# Patient Record
Sex: MALE | Race: WHITE | NOT HISPANIC OR LATINO | Employment: OTHER | URBAN - METROPOLITAN AREA
[De-identification: names, ages, dates, MRNs, and addresses within clinical notes are randomized per-mention and may not be internally consistent; named-entity substitution may affect disease eponyms.]

---

## 2015-03-26 LAB — CRC RECOMMENDATION EXT: NORMAL

## 2017-01-05 PROBLEM — R73.9 HYPERGLYCEMIA: Status: ACTIVE | Noted: 2017-01-05

## 2017-02-17 PROBLEM — M51.36 DDD (DEGENERATIVE DISC DISEASE), LUMBAR: Status: ACTIVE | Noted: 2017-02-17

## 2017-03-06 PROBLEM — M51.36 ANNULAR TEAR OF LUMBAR DISC: Status: ACTIVE | Noted: 2017-03-06

## 2017-03-06 PROBLEM — R05.9 COUGH: Status: ACTIVE | Noted: 2017-03-06

## 2017-03-06 PROBLEM — M51.26 PROTRUDED LUMBAR DISC: Status: ACTIVE | Noted: 2017-03-06

## 2017-05-03 PROBLEM — M47.816 LUMBAR SPONDYLOSIS: Status: ACTIVE | Noted: 2017-05-03

## 2017-05-03 PROBLEM — M51.26 LUMBAR HERNIATED DISC: Status: ACTIVE | Noted: 2017-05-03

## 2017-05-03 PROBLEM — R22.42 MASS OF LEFT HIP REGION: Status: ACTIVE | Noted: 2017-05-03

## 2017-05-04 ENCOUNTER — HOSPITAL ENCOUNTER (OUTPATIENT)
Dept: RADIOLOGY | Facility: HOSPITAL | Age: 62
Discharge: HOME | End: 2017-05-04
Attending: SPECIALIST
Payer: COMMERCIAL

## 2017-05-04 DIAGNOSIS — M89.9 DISORDER OF BONE: ICD-10-CM

## 2017-05-04 PROCEDURE — 73723 MRI JOINT LWR EXTR W/O&W/DYE: CPT | Mod: 26,LT,, | Performed by: RADIOLOGY

## 2017-05-04 PROCEDURE — A9585 GADOBUTROL INJECTION: HCPCS | Performed by: SPECIALIST

## 2017-05-04 PROCEDURE — 25500020 PHARM REV CODE 255: Performed by: SPECIALIST

## 2017-05-04 PROCEDURE — 73723 MRI JOINT LWR EXTR W/O&W/DYE: CPT | Mod: TC,LT

## 2017-05-04 RX ORDER — GADOBUTROL 604.72 MG/ML
INJECTION INTRAVENOUS
Status: DISCONTINUED
Start: 2017-05-04 | End: 2017-05-05 | Stop reason: HOSPADM

## 2017-05-04 RX ORDER — GADOBUTROL 604.72 MG/ML
10 INJECTION INTRAVENOUS
Status: COMPLETED | OUTPATIENT
Start: 2017-05-04 | End: 2017-05-04

## 2017-05-04 RX ADMIN — GADOBUTROL 10 ML: 604.72 INJECTION INTRAVENOUS at 08:05

## 2017-05-12 PROBLEM — D16.22 OSTEOCHONDROMA OF LEFT FEMUR: Status: ACTIVE | Noted: 2017-05-12

## 2017-05-12 PROBLEM — M16.12 ARTHRITIS OF LEFT HIP: Status: ACTIVE | Noted: 2017-05-12

## 2018-01-11 ENCOUNTER — HOSPITAL ENCOUNTER (OUTPATIENT)
Dept: RADIOLOGY | Facility: HOSPITAL | Age: 63
Discharge: HOME | End: 2018-01-11
Attending: INTERNAL MEDICINE
Payer: COMMERCIAL

## 2018-01-11 PROCEDURE — 73721 MRI JNT OF LWR EXTRE W/O DYE: CPT | Mod: 26,LT,, | Performed by: RADIOLOGY

## 2018-01-11 PROCEDURE — 73721 MRI JNT OF LWR EXTRE W/O DYE: CPT | Mod: TC,LT

## 2018-04-23 ENCOUNTER — TELEPHONE (OUTPATIENT)
Dept: ORTHOPEDICS | Facility: CLINIC | Age: 63
End: 2018-04-23

## 2018-04-23 ENCOUNTER — PATIENT MESSAGE (OUTPATIENT)
Dept: ORTHOPEDICS | Facility: CLINIC | Age: 63
End: 2018-04-23

## 2018-04-23 NOTE — TELEPHONE ENCOUNTER
Called pt to advise that we do not treat osteochondroma of his left femur.  Advised that I will contact PCP to have him referred to correct provider.  Pt verbalized understanding.

## 2018-04-23 NOTE — TELEPHONE ENCOUNTER
----- Message from Wendy Byrd LPN sent at 4/23/2018 11:12 AM CDT -----  Contact: Dr. Ole Ortiz's office      ----- Message -----  From: Helen Worthington  Sent: 4/23/2018  10:19 AM  To: [facility] Ortho Triage    Diana is calling to speak with Dr Lowe in regards to pt (did not give detail) and can be reached at [phone].    Thank you

## 2018-04-23 NOTE — TELEPHONE ENCOUNTER
I spoke with doctor's office and informed them that Dr. Lowe stated that he could see patient in late June/early July.  Nurse says that she will get back with us.

## 2018-05-04 ENCOUNTER — OFFICE VISIT (OUTPATIENT)
Dept: ORTHOPEDICS | Facility: CLINIC | Age: 63
End: 2018-05-04
Payer: COMMERCIAL

## 2018-05-04 VITALS — HEIGHT: 70 IN | WEIGHT: 265 LBS | BODY MASS INDEX: 37.94 KG/M2

## 2018-05-04 DIAGNOSIS — M76.62 ACHILLES TENDINITIS OF LEFT LOWER EXTREMITY: ICD-10-CM

## 2018-05-04 DIAGNOSIS — D16.22 OSTEOCHONDROMA OF FEMUR, LEFT: Primary | ICD-10-CM

## 2018-05-04 PROCEDURE — 3008F BODY MASS INDEX DOCD: CPT | Mod: CPTII,S$GLB,, | Performed by: ORTHOPAEDIC SURGERY

## 2018-05-04 PROCEDURE — 99999 PR PBB SHADOW E&M-EST. PATIENT-LVL III: CPT | Mod: PBBFAC,,, | Performed by: ORTHOPAEDIC SURGERY

## 2018-05-04 PROCEDURE — 99203 OFFICE O/P NEW LOW 30 MIN: CPT | Mod: S$GLB,,, | Performed by: ORTHOPAEDIC SURGERY

## 2018-05-04 RX ORDER — INDOMETHACIN 50 MG/1
50 CAPSULE ORAL
Qty: 90 CAPSULE | Refills: 1 | Status: SHIPPED | OUTPATIENT
Start: 2018-05-04 | End: 2018-07-16

## 2018-05-04 NOTE — PROGRESS NOTES
Subjective:      Patient ID: Austen Parish is a 62 y.o. male.    Chief Complaint: Left hip and left heel pain  HPI      The patient seeks evaluation of an osteochondroma the left hip.  He reports that he is known about it for a year and a half.  It causes mild intermittent discomfort, but does not really limit his activities.  He also complains of about 1 month of increasing left heel pain.  This limits his walking.  Neither of these problems have been actively treated    Review of Systems   Constitution: Negative for fever and weight loss.   HENT: Negative for congestion.    Eyes: Negative for visual disturbance.   Cardiovascular: Negative for chest pain.   Respiratory: Negative for shortness of breath.    Hematologic/Lymphatic: Negative for bleeding problem. Does not bruise/bleed easily.   Skin: Negative for poor wound healing.   Musculoskeletal: Positive for joint pain.   Gastrointestinal: Negative for abdominal pain.   Genitourinary: Negative for dysuria.   Neurological: Negative for seizures.   Psychiatric/Behavioral: Negative for altered mental status.   Allergic/Immunologic: Negative for persistent infections.         Objective:            Ortho/SPM Exam      There were no vitals filed for this visit.     Left Hip    The patient is not in acute distress.   Body habitus is:obese.   The patient walks without a limp.   The skin over the hip is:intact.   There is:A palpable nontender hard mass on the posterior lateral aspect of the proximal femur.  Range of motion- Flexion 110, External rotation 35, internal rotation 30.  Resisted SLR negative.  Pain with rotation negative  Sciatic tension findings negative.  Shortening/lengthening compared to the contralateral side exam deferred.  Pulses DP present, PT present.  Motor normal 5/5 strength in all tested muscle groups.   Sensory normal.  There is tenderness in the mid substance of the left Achilles.  There is full range of motion in the ankle.    I reviewed the  relevant radiographic images for the patient's condition: Outside plain films and MRI of the left hip are reviewed.  There is a pedunculated osteochondroma in the area of the lesser trochanter.  There are no findings to suggest malignancy.      Assessment:       Encounter Diagnoses   Name Primary?    Achilles tendinitis of left lower extremity     Osteochondroma of femur, left Yes            The osteochondroma is a benign condition.  Elective excision is possible that entails the usual surgical risks as well is that of fracture    The Achilles problem has the potential to be persistent and limiting  Plan:       Austen was seen today for pain and pain.    Diagnoses and all orders for this visit:    Osteochondroma of femur, left    Achilles tendinitis of left lower extremity        I explained my opinion about both problems.  I recommend that we treat the Achilles problem aggressively for now.  Appropriate modification of shoes explained.  activity modification explained.  Indocin with usual precautions    The osteochondroma requires observation; if it enlarges or becomes painful excision can be considered

## 2018-05-04 NOTE — LETTER
May 4, 2018      Betty Woods III, MD  [address]  [place] [address]           [facility]  [address] [place] [address] [address]  [facility] [address]  Phone: [phone]          Patient: Austen Parish   MR Number: [id number]   YOB: 1955   Date of Visit: 5/4/2018       Dear Dr. Betty Woods III:    Thank you for referring Austen Parish to me for evaluation. Attached you will find relevant portions of my assessment and plan of care.    If you have questions, please do not hesitate to call me. I look forward to following Austen Parish along with you.    Sincerely,    Tano Ross MD    Enclosure  CC:  No Recipients    If you would like to receive this communication electronically, please contact [email or web address] or [phone] to request more information on [employer] access.    For providers and/or their staff who would like to refer a patient to [facility], please contact us through our one-stop-shop provider referral line, [facility], at [phone].    If you feel you have received this communication in error or would no longer like to receive these types of communications, please e-mail [email or web address]

## 2018-05-10 ENCOUNTER — PATIENT MESSAGE (OUTPATIENT)
Dept: ORTHOPEDICS | Facility: CLINIC | Age: 63
End: 2018-05-10

## 2018-05-10 ENCOUNTER — TELEPHONE (OUTPATIENT)
Dept: ORTHOPEDICS | Facility: CLINIC | Age: 63
End: 2018-05-10

## 2018-05-10 NOTE — TELEPHONE ENCOUNTER
My [facility] chart message was sent to patient.  ----- Message from Aviva Hernandez sent at 5/10/2018 10:44 AM CDT -----  Contact: Self/ [phone]  Patient called in to speak about upcoming appointment and x ray.     Please call and advise.

## 2018-05-11 ENCOUNTER — TELEPHONE (OUTPATIENT)
Dept: ORTHOPEDICS | Facility: CLINIC | Age: 63
End: 2018-05-11

## 2018-05-11 NOTE — TELEPHONE ENCOUNTER
Spoke with patient to inform him xray orders are in and he may get the xrays completed at any [facility] facility. Verbalized understanding.

## 2018-05-15 ENCOUNTER — HOSPITAL ENCOUNTER (OUTPATIENT)
Dept: RADIOLOGY | Facility: HOSPITAL | Age: 63
Discharge: HOME | End: 2018-05-15
Attending: ORTHOPAEDIC SURGERY
Payer: COMMERCIAL

## 2018-05-15 DIAGNOSIS — D16.22 OSTEOCHONDROMA OF FEMUR, LEFT: ICD-10-CM

## 2018-05-15 PROCEDURE — 73502 X-RAY EXAM HIP UNI 2-3 VIEWS: CPT | Mod: TC,FY,LT

## 2018-05-15 PROCEDURE — 73502 X-RAY EXAM HIP UNI 2-3 VIEWS: CPT | Mod: 26,LT,, | Performed by: RADIOLOGY

## 2018-06-05 ENCOUNTER — OFFICE VISIT (OUTPATIENT)
Dept: ORTHOPEDICS | Facility: CLINIC | Age: 63
End: 2018-06-05
Payer: COMMERCIAL

## 2018-06-05 VITALS
DIASTOLIC BLOOD PRESSURE: 69 MMHG | HEIGHT: 70 IN | BODY MASS INDEX: 38.8 KG/M2 | WEIGHT: 271 LBS | SYSTOLIC BLOOD PRESSURE: 123 MMHG

## 2018-06-05 DIAGNOSIS — M76.62 ACHILLES TENDINITIS OF LEFT LOWER EXTREMITY: Primary | ICD-10-CM

## 2018-06-05 DIAGNOSIS — D16.22 OSTEOCHONDROMA OF FEMUR, LEFT: ICD-10-CM

## 2018-06-05 PROCEDURE — 99999 PR PBB SHADOW E&M-EST. PATIENT-LVL III: CPT | Mod: PBBFAC,,, | Performed by: ORTHOPAEDIC SURGERY

## 2018-06-05 PROCEDURE — 99213 OFFICE O/P EST LOW 20 MIN: CPT | Mod: S$GLB,,, | Performed by: ORTHOPAEDIC SURGERY

## 2018-06-05 PROCEDURE — 3008F BODY MASS INDEX DOCD: CPT | Mod: CPTII,S$GLB,, | Performed by: ORTHOPAEDIC SURGERY

## 2018-06-05 PROCEDURE — 3074F SYST BP LT 130 MM HG: CPT | Mod: CPTII,S$GLB,, | Performed by: ORTHOPAEDIC SURGERY

## 2018-06-05 PROCEDURE — 3078F DIAST BP <80 MM HG: CPT | Mod: CPTII,S$GLB,, | Performed by: ORTHOPAEDIC SURGERY

## 2018-06-05 NOTE — PROGRESS NOTES
Subjective:      Patient ID: Austen Parish is a 62 y.o. male.    Chief Complaint: Pain of the Left Hip    HPI  Follow up for left hip and left Achilles.  Patient reports that the Achilles is improving.  The hip is unchanged.  He denies any degree of disabling symptoms either place at this time      Review of Systems   Constitution: Negative for fever and weight loss.   HENT: Negative for congestion.    Eyes: Negative for visual disturbance.   Cardiovascular: Negative for chest pain.   Respiratory: Negative for shortness of breath.    Hematologic/Lymphatic: Negative for bleeding problem. Does not bruise/bleed easily.   Skin: Negative for poor wound healing.   Gastrointestinal: Negative for abdominal pain.   Genitourinary: Negative for dysuria.   Neurological: Negative for seizures.   Psychiatric/Behavioral: Negative for altered mental status.   Allergic/Immunologic: Negative for persistent infections.         Objective:  Achilles            Ortho/SPM Exam  Normally developed man in no distress  No limp  Left hip skin intact  The mass the left hip is palpable deep in the posterior musculature. No tenderness.  Left hip has full painless range of motion.  The left Achilles has mild enlargement and tenderness. There is full range of motion in the ankle    Left lower extremity neurovascular exam is intact    I reviewed the relevant radiographic images for the patient's condition:  Bony mass on the posterior proximal left femur with appearance of osteochondroma is unchanged from prior imaging                    Assessment:       Encounter Diagnoses   Name Primary?    Achilles tendinitis of left lower extremity Yes    Osteochondroma of femur, left         The Achilles tendinitis is improving  The mass is behaving as a benign process      Plan:       Austen was seen today for pain.    Diagnoses and all orders for this visit:    Achilles tendinitis of left lower extremity    Osteochondroma of femur, left      I explained  my diagnostic impression and the reasoning behind it in detail, using layman's terms.  Models and/or pictures were used to help in the explanation.    Appropriate advancement of activities discussed with regard to the Achilles tendon    Appropriate exercise discussed with regard to the tendinitis    Decrease indomethacin to twice daily    Possible role of excision of the osteochondroma was also discussed and we agreed to observe the process with present time

## 2018-12-06 ENCOUNTER — HOSPITAL ENCOUNTER (OUTPATIENT)
Dept: RADIOLOGY | Facility: HOSPITAL | Age: 63
Discharge: HOME | End: 2018-12-06
Attending: ORTHOPAEDIC SURGERY
Payer: COMMERCIAL

## 2018-12-06 DIAGNOSIS — D16.22 OSTEOCHONDROMA OF FEMUR, LEFT: ICD-10-CM

## 2018-12-06 PROCEDURE — 73502 X-RAY EXAM HIP UNI 2-3 VIEWS: CPT | Mod: 26,,, | Performed by: RADIOLOGY

## 2018-12-06 PROCEDURE — 73502 X-RAY EXAM HIP UNI 2-3 VIEWS: CPT | Mod: TC,FY

## 2018-12-10 ENCOUNTER — TELEPHONE (OUTPATIENT)
Dept: ORTHOPEDICS | Facility: CLINIC | Age: 63
End: 2018-12-10

## 2018-12-10 DIAGNOSIS — M25.552 LEFT HIP PAIN: Primary | ICD-10-CM

## 2018-12-13 ENCOUNTER — HOSPITAL ENCOUNTER (OUTPATIENT)
Dept: RADIOLOGY | Facility: HOSPITAL | Age: 63
Discharge: HOME | End: 2018-12-13
Attending: ORTHOPAEDIC SURGERY
Payer: COMMERCIAL

## 2018-12-13 ENCOUNTER — OFFICE VISIT (OUTPATIENT)
Dept: ORTHOPEDICS | Facility: CLINIC | Age: 63
End: 2018-12-13
Payer: COMMERCIAL

## 2018-12-13 VITALS — BODY MASS INDEX: 38.8 KG/M2 | HEIGHT: 70 IN | WEIGHT: 271 LBS

## 2018-12-13 DIAGNOSIS — D16.22 OSTEOCHONDROMA OF LEFT FEMUR: Primary | ICD-10-CM

## 2018-12-13 DIAGNOSIS — M25.552 LEFT HIP PAIN: ICD-10-CM

## 2018-12-13 PROCEDURE — 99999 PR PBB SHADOW E&M-EST. PATIENT-LVL III: CPT | Mod: PBBFAC,,, | Performed by: ORTHOPAEDIC SURGERY

## 2018-12-13 PROCEDURE — 3008F BODY MASS INDEX DOCD: CPT | Mod: CPTII,S$GLB,, | Performed by: ORTHOPAEDIC SURGERY

## 2018-12-13 PROCEDURE — 99213 OFFICE O/P EST LOW 20 MIN: CPT | Mod: S$GLB,,, | Performed by: ORTHOPAEDIC SURGERY

## 2018-12-13 NOTE — PROGRESS NOTES
Subjective:      Patient ID: Austen Parish is a 63 y.o. male.    Chief Complaint: Pain of the Left Hip    HPI FOLLOW-UP FOR OSTEOCHONDROMA OF THE LEFT HIP. PATIENT REPORTS NO CHANGE.  HE HAS OCCASIONAL MILD ACHING IN THE LEFT GLUTEAL AREA. NO FUNCTIONAL LIMITATIONS.    Review of Systems   Constitution: Negative for fever and weight loss.   HENT: Negative for congestion.    Eyes: Negative for visual disturbance.   Cardiovascular: Negative for chest pain.   Respiratory: Negative for shortness of breath.    Hematologic/Lymphatic: Negative for bleeding problem. Does not bruise/bleed easily.   Skin: Negative for poor wound healing.   Gastrointestinal: Negative for abdominal pain.   Genitourinary: Negative for dysuria.   Neurological: Negative for seizures.   Psychiatric/Behavioral: Negative for altered mental status.   Allergic/Immunologic: Negative for persistent infections.         Objective:            Ortho/SPM Exam    Left hip    The patient is not in acute distress.   Body habitus is:normal.   Sclerae normal  The patient walks without a limp.   none  The skin over the hip is:intact.   There is:no local tenderness.   Range of motion- Flexion 100, External rotation 30, internal rotation 30.  Resisted SLR negative.  Pain with rotation negative  Sciatic tension findings negative.  Shortening/lengthening compared to the contralateral side exam deferred.  Pulses DP present, PT present.  Motor normal 5/5 strength in all tested muscle groups.   Sensory normal.    I reviewed the relevant radiographic images for the patient's condition:  Recent left hip radiographs show evidence of osteochondroma without interval change along the posterior cortex of the left proximal femur      Assessment:       Encounter Diagnosis   Name Primary?    Osteochondroma of left femur Yes          There is no evidence of growth or symptoms to suggest malignant transformation  Plan:       Austen was seen today for pain.    Diagnoses and all  orders for this visit:    Osteochondroma of left femur      I explained my diagnostic impression and the reasoning behind it in detail, using layman's terms.  Models and/or pictures were used to help in the explanation.    Continued observation the interval radiographs recommended.  I advised the patient to contact me for any acute worsening or changes.      Patient agrees to this process    X-ray follow-up

## 2019-06-08 RX ORDER — AZITHROMYCIN 250 MG/1
TABLET, FILM COATED ORAL
Qty: 6 TABLET | Refills: 0 | Status: SHIPPED | OUTPATIENT
Start: 2019-06-08 | End: 2019-07-16 | Stop reason: ALTCHOICE

## 2019-07-19 ENCOUNTER — TELEPHONE (OUTPATIENT)
Dept: ORTHOPEDICS | Facility: CLINIC | Age: 64
End: 2019-07-19

## 2019-07-19 NOTE — TELEPHONE ENCOUNTER
I will give this to our Triage Nurse Wendy Ayala LPN to get an appt with Dr Yoon       ----- Message from John Ferreira III, MD sent at 7/17/2019  2:54 PM CDT -----  Contact: Self  This is dr portillo's patient, dr portillo saw the patient 12/18 for left hip pain in the setting of what is probably an osteochondroma. It looks like this new referral is for that same exact issue.     I would recommend that an appointment be made with dr portillo.   If patient does not want to see dr portillo then he needs to see Dr Luis Yoon, orthopedic oncologist, at \[facility]\"" as we do not have one here    Thank you      ----- Message -----  From: Cecilia Johnson MA  Sent: 7/17/2019  11:42 AM  To: Elise Adame MA, John Ferreira III, MD        ----- Message -----  From: Annalise Devlin  Sent: 7/17/2019  10:47 AM  To: Sherry OWEN Staff    Pt is calling to schedule a second opinion for I10 (ICD-10-CM) - Essential hypertension  R73.9 (ICD-10-CM) - Hyperglycemia  Z79.899 (ICD-10-CM) - Encounter for long-term (current) use of high-risk medication  E78.5 (ICD-10-CM) - Hyperlipidemia, unspecified hyperlipidemia type  M51.36 (ICD-10-CM) - DDD (degenerative disc disease), lumbar  R09.89 (ICD-10-CM) - Carotid bruit, unspecified laterality  M76.62 (ICD-10-CM) - Achilles tendinitis of left lower extremity  R94.7 (ICD-10-CM) - Elevated C peptide level  M51.26 (ICD-10-CM) - Lumbar herniated disc  D16.22 (ICD-10-CM) - Osteochondroma     Referral is in system pt can be reached @[phone]

## 2019-07-24 ENCOUNTER — TELEPHONE (OUTPATIENT)
Dept: ORTHOPEDICS | Facility: CLINIC | Age: 64
End: 2019-07-24

## 2019-07-24 NOTE — TELEPHONE ENCOUNTER
----- Message from Manjula Velazquez sent at 7/24/2019 12:33 PM CDT -----  Contact: Self [phone]  Patient Returning Your Phone Call

## 2019-07-24 NOTE — TELEPHONE ENCOUNTER
----- Message from Tano Portillo MD sent at 7/22/2019  9:57 AM CDT -----  Contact: Self  I am confused about what the patient and his doctor want.  He has a benign growth on his pelvis.  Dr. Yoon is an expert in this field, and if the patient or his primary prefer, the patient may see him.  Alternatively, I am happy to see the patient if he wishes.  I would leave it to the discretion of the patient and his primary physician.  ----- Message -----  From: Shauna Gonsalves MA  Sent: 7/22/2019   9:18 AM  To: Tano Portillo MD    See Below.  Please advise if I should schedule this pt with you or let them know the pt needs to see Dr Yoon.    ----- Message -----  From: Wendy Byrd LPN  Sent: 7/19/2019   2:07 PM  To: Tano Portillo MD, #    Dr. Cody -    As noted below, Dr. Ferreira recommends that Mr. Parish schedule an appt with you. If an appt with /[facility] is needed, we have instructions from his office for referrals to ensure that the soonest appt is scheduled. I can provide them to your or Dr. Woods's staff via a separate staff message. Please advise.   Thank you,  Wendy  ----- Message -----  From: Saundra Tavera LPN  Sent: 7/19/2019   1:51 PM  To: [facility] Ortho Triage        ----- Message -----  From: John Ferreira III, MD  Sent: 7/17/2019   2:54 PM  To: Cecilia Johnson MA, Elise Adame MA    This is dr portillo's patient, dr portillo saw the patient 12/18 for left hip pain in the setting of what is probably an osteochondroma. It looks like this new referral is for that same exact issue.     I would recommend that an appointment be made with dr portillo.   If patient does not want to see dr portillo then he needs to see Dr Luis Yoon, orthopedic oncologist, at [facility] as we do not have one here    Thank you      ----- Message -----  From: Cecilia Johnson MA  Sent: 7/17/2019  11:42 AM  To: Elise Adame MA, John Ferreira III, MD        ----- Message  -----  From: Annalise Devlin  Sent: 7/17/2019  10:47 AM  To: Sherry OWEN Staff    Pt is calling to schedule a second opinion for I10 (ICD-10-CM) - Essential hypertension  R73.9 (ICD-10-CM) - Hyperglycemia  Z79.899 (ICD-10-CM) - Encounter for long-term (current) use of high-risk medication  E78.5 (ICD-10-CM) - Hyperlipidemia, unspecified hyperlipidemia type  M51.36 (ICD-10-CM) - DDD (degenerative disc disease), lumbar  R09.89 (ICD-10-CM) - Carotid bruit, unspecified laterality  M76.62 (ICD-10-CM) - Achilles tendinitis of left lower extremity  R94.7 (ICD-10-CM) - Elevated C peptide level  M51.26 (ICD-10-CM) - Lumbar herniated disc  D16.22 (ICD-10-CM) - Osteochondroma     Referral is in system pt can be reached @[phone]

## 2019-09-17 ENCOUNTER — HOSPITAL ENCOUNTER (OUTPATIENT)
Dept: RADIOLOGY | Facility: HOSPITAL | Age: 64
Discharge: HOME | End: 2019-09-17
Attending: INTERNAL MEDICINE
Payer: COMMERCIAL

## 2019-09-17 PROCEDURE — 73721 MRI HIP WITHOUT CONTRAST LEFT: ICD-10-PCS | Mod: 26,LT,, | Performed by: RADIOLOGY

## 2019-09-17 PROCEDURE — 73721 MRI JNT OF LWR EXTRE W/O DYE: CPT | Mod: TC,LT

## 2019-09-17 PROCEDURE — 73721 MRI JNT OF LWR EXTRE W/O DYE: CPT | Mod: 26,LT,, | Performed by: RADIOLOGY

## 2022-05-02 ENCOUNTER — PATIENT OUTREACH (OUTPATIENT)
Dept: ADMINISTRATIVE | Facility: HOSPITAL | Age: 67
End: 2022-05-02
Payer: MEDICARE

## 2022-05-02 NOTE — PROGRESS NOTES
[facility] CMS chart audits. Chart review completed for  test overdue (mammograms, Colonoscopies, pap smears, DM labs, and/or EYE EXAMs)      Care Everywhere and media, updates requested and reviewed.    [facility] and [facility] reviewed.       HM updated with external colonoscopy report from [facility].

## 2022-09-14 ENCOUNTER — HOSPITAL ENCOUNTER (OUTPATIENT)
Dept: CARDIOLOGY | Facility: HOSPITAL | Age: 67
Discharge: HOME | End: 2022-09-14
Attending: INTERNAL MEDICINE
Payer: MEDICARE

## 2022-09-14 VITALS
HEIGHT: 70 IN | DIASTOLIC BLOOD PRESSURE: 56 MMHG | BODY MASS INDEX: 39.8 KG/M2 | HEART RATE: 87 BPM | WEIGHT: 278 LBS | SYSTOLIC BLOOD PRESSURE: 152 MMHG

## 2022-09-14 DIAGNOSIS — E78.00 HYPERCHOLESTEREMIA: ICD-10-CM

## 2022-09-14 DIAGNOSIS — Z79.899 ENCOUNTER FOR LONG-TERM (CURRENT) USE OF MEDICATIONS: ICD-10-CM

## 2022-09-14 DIAGNOSIS — I10 ESSENTIAL HYPERTENSION: ICD-10-CM

## 2022-09-14 DIAGNOSIS — R06.02 SOB (SHORTNESS OF BREATH): ICD-10-CM

## 2022-09-14 DIAGNOSIS — R73.9 HYPERGLYCEMIA: ICD-10-CM

## 2022-09-14 DIAGNOSIS — R94.7 ELEVATED C PEPTIDE LEVEL: ICD-10-CM

## 2022-09-14 DIAGNOSIS — R06.09 DOE (DYSPNEA ON EXERTION): ICD-10-CM

## 2022-09-14 LAB
AORTIC ROOT ANNULUS: 3.07 CM
AORTIC VALVE CUSP SEPERATION: 1.9 CM
BSA FOR ECHO PROCEDURE: 2.5 M2
CV ECHO LV RWT: 0.45 CM
CV STRESS BASE HR: 71 BPM
DIASTOLIC BLOOD PRESSURE: 81 MMHG
DOP CALC LVOT AREA: 3.9 CM2
DOP CALC LVOT DIAMETER: 2.23 CM
ECHO LV POSTERIOR WALL: 1.14 CM (ref 0.6–1.1)
EJECTION FRACTION: 63 %
FRACTIONAL SHORTENING: 34 % (ref 28–44)
INTERVENTRICULAR SEPTUM: 1.11 CM (ref 0.6–1.1)
LA MAJOR: 4.15 CM
LA MINOR: 3.24 CM
LEFT ATRIUM SIZE: 4.67 CM
LEFT ATRIUM VOLUME INDEX MOD: 5.5 ML/M2
LEFT ATRIUM VOLUME MOD: 13.1 CM3
LEFT INTERNAL DIMENSION IN SYSTOLE: 3.36 CM (ref 2.1–4)
LEFT VENTRICLE DIASTOLIC VOLUME INDEX: 52.1 ML/M2
LEFT VENTRICLE DIASTOLIC VOLUME: 125.05 ML
LEFT VENTRICLE MASS INDEX: 92 G/M2
LEFT VENTRICLE SYSTOLIC VOLUME INDEX: 19.3 ML/M2
LEFT VENTRICLE SYSTOLIC VOLUME: 46.22 ML
LEFT VENTRICULAR INTERNAL DIMENSION IN DIASTOLE: 5.12 CM (ref 3.5–6)
LEFT VENTRICULAR MASS: 221.99 G
OHS CV CPX 1 MINUTE RECOVERY HEART RATE: 117 BPM
OHS CV CPX 85 PERCENT MAX PREDICTED HEART RATE MALE: 131
OHS CV CPX ESTIMATED METS: 8
OHS CV CPX MAX PREDICTED HEART RATE: 154
OHS CV CPX PATIENT IS FEMALE: 0
OHS CV CPX PATIENT IS MALE: 1
OHS CV CPX PEAK DIASTOLIC BLOOD PRESSURE: 85 MMHG
OHS CV CPX PEAK HEAR RATE: 139 BPM
OHS CV CPX PEAK RATE PRESSURE PRODUCT: ABNORMAL
OHS CV CPX PEAK SYSTOLIC BLOOD PRESSURE: 205 MMHG
OHS CV CPX PERCENT MAX PREDICTED HEART RATE ACHIEVED: 90
OHS CV CPX RATE PRESSURE PRODUCT PRESENTING: ABNORMAL
RA MAJOR: 4.15 CM
RA WIDTH: 2.7 CM
RIGHT VENTRICULAR END-DIASTOLIC DIMENSION: 3.58 CM
STRESS ECHO POST EXERCISE DUR MIN: 5 MINUTES
STRESS ECHO POST EXERCISE DUR SEC: 15 SECONDS
SYSTOLIC BLOOD PRESSURE: 165 MMHG

## 2022-09-14 PROCEDURE — 93351 STRESS TTE COMPLETE: CPT

## 2022-09-14 PROCEDURE — 93351 STRESS ECHO (CUPID ONLY): ICD-10-PCS | Mod: 26,,, | Performed by: INTERNAL MEDICINE

## 2022-09-14 PROCEDURE — 93351 STRESS TTE COMPLETE: CPT | Mod: 26,,, | Performed by: INTERNAL MEDICINE

## 2022-09-14 NOTE — NURSING NOTE
2 patient identifier name and date of birth confirmed as stated by patient and matched with armband. Informed consent obtained. Dr Bravo here.

## 2023-03-09 ENCOUNTER — OFFICE VISIT (OUTPATIENT)
Dept: SURGERY | Facility: CLINIC | Age: 68
End: 2023-03-09
Payer: MEDICARE

## 2023-03-09 VITALS
OXYGEN SATURATION: 96 % | BODY MASS INDEX: 41.09 KG/M2 | DIASTOLIC BLOOD PRESSURE: 73 MMHG | HEART RATE: 70 BPM | SYSTOLIC BLOOD PRESSURE: 146 MMHG | HEIGHT: 70 IN | WEIGHT: 287 LBS

## 2023-03-09 DIAGNOSIS — Z86.010 PERSONAL HISTORY OF COLONIC POLYPS: Primary | ICD-10-CM

## 2023-03-09 PROCEDURE — 99215 OFFICE O/P EST HI 40 MIN: CPT | Mod: PBBFAC | Performed by: SURGERY

## 2023-03-09 PROCEDURE — 99203 OFFICE O/P NEW LOW 30 MIN: CPT | Mod: S$PBB,,, | Performed by: SURGERY

## 2023-03-09 PROCEDURE — 99203 PR OFFICE/OUTPT VISIT, NEW, LEVL III, 30-44 MIN: ICD-10-PCS | Mod: S$PBB,,, | Performed by: SURGERY

## 2023-03-09 PROCEDURE — 99999 PR PBB SHADOW E&M-EST. PATIENT-LVL V: ICD-10-PCS | Mod: PBBFAC,,, | Performed by: SURGERY

## 2023-03-09 PROCEDURE — 99999 PR PBB SHADOW E&M-EST. PATIENT-LVL V: CPT | Mod: PBBFAC,,, | Performed by: SURGERY

## 2023-03-09 RX ORDER — SOD SULF/POT CHLORIDE/MAG SULF 1.479 G
24 TABLET ORAL DAILY
Qty: 24 TABLET | Refills: 0 | Status: ON HOLD | OUTPATIENT
Start: 2023-03-09 | End: 2023-04-03 | Stop reason: HOSPADM

## 2023-03-09 RX ORDER — SODIUM CHLORIDE 9 MG/ML
INJECTION, SOLUTION INTRAVENOUS CONTINUOUS
Status: CANCELLED | OUTPATIENT
Start: 2023-03-09

## 2023-03-09 NOTE — H&P
[facility] H&P Note    Subjective:       Patient ID: Austen Parish is a 67 y.o. male.    Chief Complaint:  Doc I need a colonoscopy.  HPI:  Austen Parish is a 67 y.o. male history of multiple past medical troubles presents today as a new patient referral from Dr. Woods for evaluation of colonoscopy.  Patient's last colonoscopy 2015.  Due for repeat 5 years after that shreyas.  Patient now passed due.  Personal history of colon polyps.  Diverticulosis identified previously.  Patient has had no blood in the stool.  No unexplained weight loss, no bowel habit changes.  No known family history of colon or rectal cancers.    Past Medical History:   Diagnosis Date    Carotid bruit     Lumbago     Obstructive sleep apnea (adult) (pediatric)     Other and unspecified hyperlipidemia     Personal history of colonic polyps 03/26/2015    Darryn Laird MD    Unspecified essential hypertension      Past Surgical History:   Procedure Laterality Date    COLONOSCOPY      Dr. Laird    COLONOSCOPY  03/26/2015    Darryn Laird MD, No polyps, Repeat in 5 years    KNEE ARTHROSCOPY       No family history on file.  Social History     Socioeconomic History    Marital status:    Tobacco Use    Smoking status: Former    Smokeless tobacco: Never   Substance and Sexual Activity    Alcohol use: Yes    Drug use: No    Sexual activity: Not Currently     Partners: Female       Current Outpatient Medications   Medication Sig Dispense Refill    amLODIPine (NORVASC) 5 MG tablet TAKE 1 TABLET BY MOUTH EVERY DAY 30 tablet 11    aspirin (ECOTRIN) 81 MG EC tablet Take 81 mg by mouth once daily.      atorvastatin (LIPITOR) 10 MG tablet Take 1 tablet (10 mg total) by mouth once daily. 90 tablet 3    clonazePAM (KLONOPIN) 0.5 MG tablet Take 1 tablet (0.5 mg total) by mouth 2 (two) times daily as needed. 20 tablet 1    lisinopriL (PRINIVIL,ZESTRIL) 30 MG tablet TAKE 1 TABLET BY MOUTH EVERY DAY 90 tablet 3    meloxicam (MOBIC)  "15 MG tablet Take 1 tablet (15 mg total) by mouth once daily. 30 tablet 9    methylPREDNISolone (MEDROL DOSEPACK) 4 mg tablet use as directed 1 each 0    sod sulf-pot chloride-mag sulf (SUTAB) 1.479-0.188- 0.225 gram tablet Take 24 tablets by mouth once daily. Take according to CLINIC instructions with indicated amount of water. 24 tablet 0     No current facility-administered medications for this visit.     Review of patient's allergies indicates:  No Known Allergies    Review of Systems   Constitutional:  Negative for appetite change, chills and fever.   HENT:  Negative for congestion, dental problem and drooling.    Eyes:  Negative for photophobia, discharge and itching.   Respiratory:  Negative for apnea and chest tightness.    Cardiovascular:  Negative for chest pain, palpitations and leg swelling.   Gastrointestinal:  Negative for abdominal distention and abdominal pain.   Endocrine: Negative for cold intolerance and heat intolerance.   Genitourinary:  Negative for difficulty urinating and dysuria.   Musculoskeletal:  Negative for arthralgias and back pain.   Skin:  Negative for color change and pallor.   Neurological:  Negative for dizziness, facial asymmetry and headaches.   Hematological:  Negative for adenopathy. Does not bruise/bleed easily.   Psychiatric/Behavioral:  Negative for agitation, behavioral problems and confusion.      Objective:      Vitals:    03/09/23 1040   BP: (!) 178/87   Pulse: 70   SpO2: 96%   Weight: 130.2 kg (287 lb)   Height: 5' 10" (1.778 m)     Physical Exam  Constitutional:       Appearance: He is well-developed. He is not diaphoretic.   HENT:      Head: Normocephalic and atraumatic.   Eyes:      Pupils: Pupils are equal, round, and reactive to light.   Neck:      Thyroid: No thyromegaly.   Cardiovascular:      Rate and Rhythm: Normal rate and regular rhythm.      Heart sounds: No murmur heard.  Pulmonary:      Effort: Pulmonary effort is normal. No respiratory distress.      " Breath sounds: Normal breath sounds.   Abdominal:      General: Bowel sounds are normal. There is no distension.      Palpations: Abdomen is soft.      Tenderness: There is no abdominal tenderness.   Musculoskeletal:         General: Normal range of motion.      Cervical back: Normal range of motion and neck supple.   Skin:     General: Skin is warm.      Capillary Refill: Capillary refill takes less than 2 seconds.      Findings: No erythema or rash.   Neurological:      Mental Status: He is alert and oriented to person, place, and time.      Cranial Nerves: No cranial nerve deficit.        Assessment:       1. Personal history of colonic polyps          Plan:   Personal history of colonic polyps  -     Full code; Standing  -     Place in Outpatient; Standing  -     Case Request Operating Room: COLONOSCOPY  -     Vital signs; Standing  -     Insert peripheral IV; Standing  -     Notify Physician; Standing  -     Diet NPO; Standing  -     Place JOSE ALEJANDRO hose; Standing  -     Place sequential compression device; Standing    Other orders  -     0.9%  NaCl infusion  -     sod sulf-pot chloride-mag sulf (SUTAB) 1.479-0.188- 0.225 gram tablet; Take 24 tablets by mouth once daily. Take according to CLINIC instructions with indicated amount of water.  Dispense: 24 tablet; Refill: 0        Medical Decision Making/Counseling:  Personal history of colon polyps due for high risk screening colonoscopy.  Risk benefits have been discussed.  Patient voiced understanding risk benefits wished to proceed near future.      Will obtain preoperative lab test to include CBC, CMP and EKG for review by the Anesthesiologist the day of the procedure prior to induction of the anesthetic agent of choice.    Risks and benefits of endoscopy were discussed in depth in clinic.  From a procedural standpoint, we discuss the benefits of colonoscopy to be finding colon cancers at early stages, including polyps which can be endoscopically resectable, to  finding early stage colon cancers which can be better treated with current medical and surgical therapies in order to give patients a longer survival, if found in these early stages.  From a standpoint of risks, the risk of bleeding and perforation of the colon were discussed.  I personally discussed that if complications of bleeding or perforation were to occur, the patient could need as little as a blood transfusion and as much as possible hospital admission, repeat procedure, or even surgery.  During today's discussion of the procedure of colonoscopy with the patient, I personally jb the patient a picture to assist with counseling.  Total clinic time spent today 30 minutes with greater than half of the time spent in face to face counseling.    Patient instructed that best way to communicate with my office staff is for patient to get on the [facility] epic patient portal to expedite communication and communication issues that may occur.  Patient was given instructions on how to get on the portal.  I encouraged patient to obtain portal access as well.  Ultimately it is up to the patient to obtain access.  Patient voiced understanding.

## 2023-03-09 NOTE — PATIENT INSTRUCTIONS
Sutab Bowel Prep Instructions     Date of procedure:  April 3, 2023    Bowel Prep Instructions:  Do not follow packaging instructions for the Sutab.        Do not take the following Medications for 3 days prior to your procedure:   Aspirin, Excedrin, BC powder or goodies powders   Ibuprofen or Motrin  Naproxen or Aleve  Fish oils  Vitamin E    Day 1 April 2, 2023   All day you will be on a Clear Liquid Diet   You may have the following chicken, beef or bone broth, Jell-O, Popsicles, Sprite, Water, Gatorade, Powerade, and Black Coffee.   Do not eat or drink anything RED OR PURPLE IN COLOR     The Sutab Bowel Kit is split into two (2) dose Regimen:   1st Dose   Step 1: AT 2:00 pm, Take the 1st bottle (12 tablets total) with 16 oz glass of water.     Step 2: You MUST drink 2 more 16 oz glasses of water over the next hour.     2nd Dose   Step 1: At 6:00 pm, Take the 2nd bottle (12 tablets total) with 16 oz glass of water.     Step 2: You MUST drink 2 more 16-ounce glasses of water over the hour.     Do not eat or drink after Midnight       Day 2 April 3, 2023   Do not eat or drink anything till after the procedure    You may take your blood pressure medications with a small sip of water before the procedure. Take all other morning medications after the procedure      Location of Department:   [facility] [address], [place] [address]    Parking:    Use parking lot A  Enter at the main hospital entrance  Check in with outpatient registration    Contact:   Someone from surgery will call you with a time of arrival.   If you surgery is on Monday, someone will contact you on Friday.  If you do not receive a call from surgery by 2pm the business day (March 31, 2023) before your procedure, please call [phone].     Transportation:   You will NOT be able to drive yourself.  You are required to have someone drive you home from the hospital due to the anesthesia.

## 2023-03-09 NOTE — PROGRESS NOTES
Patient, Austen Parish, was instructed on Suprep Tabs bowel prep. Patient was given instructions on pre-op, briseyda-op, and post-op scheduled appointments.  Patient received blue folder containing all written instructions.  Colonoscopy scheduled for April 3, 2023    Jesus Argueta MA

## 2023-04-03 ENCOUNTER — ANESTHESIA EVENT (OUTPATIENT)
Dept: SURGERY | Facility: HOSPITAL | Age: 68
End: 2023-04-03
Payer: MEDICARE

## 2023-04-03 ENCOUNTER — HOSPITAL ENCOUNTER (OUTPATIENT)
Facility: HOSPITAL | Age: 68
Discharge: HOME | End: 2023-04-03
Attending: SURGERY | Admitting: SURGERY
Payer: MEDICARE

## 2023-04-03 ENCOUNTER — ANESTHESIA (OUTPATIENT)
Dept: SURGERY | Facility: HOSPITAL | Age: 68
End: 2023-04-03
Payer: MEDICARE

## 2023-04-03 VITALS
HEIGHT: 70 IN | HEART RATE: 67 BPM | RESPIRATION RATE: 18 BRPM | OXYGEN SATURATION: 97 % | SYSTOLIC BLOOD PRESSURE: 136 MMHG | BODY MASS INDEX: 41.09 KG/M2 | DIASTOLIC BLOOD PRESSURE: 82 MMHG | WEIGHT: 287 LBS | TEMPERATURE: 98 F

## 2023-04-03 DIAGNOSIS — Z86.010 PERSONAL HISTORY OF COLONIC POLYPS: ICD-10-CM

## 2023-04-03 PROCEDURE — 37000008 HC ANESTHESIA 1ST 15 MINUTES: Performed by: SURGERY

## 2023-04-03 PROCEDURE — 63600175 PHARM REV CODE 636 W HCPCS: Performed by: NURSE ANESTHETIST, CERTIFIED REGISTERED

## 2023-04-03 PROCEDURE — 25000003 PHARM REV CODE 250: Performed by: NURSE ANESTHETIST, CERTIFIED REGISTERED

## 2023-04-03 PROCEDURE — 88305 TISSUE EXAM BY PATHOLOGIST: CPT | Mod: 26,,, | Performed by: STUDENT IN AN ORGANIZED HEALTH CARE EDUCATION/TRAINING PROGRAM

## 2023-04-03 PROCEDURE — 88305 TISSUE EXAM BY PATHOLOGIST: CPT | Performed by: STUDENT IN AN ORGANIZED HEALTH CARE EDUCATION/TRAINING PROGRAM

## 2023-04-03 PROCEDURE — D9220A PRA ANESTHESIA: Mod: PT,CRNA,, | Performed by: NURSE ANESTHETIST, CERTIFIED REGISTERED

## 2023-04-03 PROCEDURE — 45385 PR COLONOSCOPY,REMV LESN,SNARE: ICD-10-PCS | Mod: PT,,, | Performed by: SURGERY

## 2023-04-03 PROCEDURE — 88305 TISSUE EXAM BY PATHOLOGIST: ICD-10-PCS | Mod: 26,,, | Performed by: STUDENT IN AN ORGANIZED HEALTH CARE EDUCATION/TRAINING PROGRAM

## 2023-04-03 PROCEDURE — 45384 COLONOSCOPY W/LESION REMOVAL: CPT | Mod: PT,59,, | Performed by: SURGERY

## 2023-04-03 PROCEDURE — D9220A PRA ANESTHESIA: ICD-10-PCS | Mod: PT,CRNA,, | Performed by: NURSE ANESTHETIST, CERTIFIED REGISTERED

## 2023-04-03 PROCEDURE — 45385 COLONOSCOPY W/LESION REMOVAL: CPT | Mod: PT | Performed by: SURGERY

## 2023-04-03 PROCEDURE — 45384 COLONOSCOPY W/LESION REMOVAL: CPT | Mod: PT,59 | Performed by: SURGERY

## 2023-04-03 PROCEDURE — D9220A PRA ANESTHESIA: ICD-10-PCS | Mod: PT,ANES,, | Performed by: ANESTHESIOLOGY

## 2023-04-03 PROCEDURE — 37000009 HC ANESTHESIA EA ADD 15 MINS: Performed by: SURGERY

## 2023-04-03 PROCEDURE — D9220A PRA ANESTHESIA: Mod: PT,ANES,, | Performed by: ANESTHESIOLOGY

## 2023-04-03 PROCEDURE — 45385 COLONOSCOPY W/LESION REMOVAL: CPT | Mod: PT,,, | Performed by: SURGERY

## 2023-04-03 PROCEDURE — 63600175 PHARM REV CODE 636 W HCPCS: Performed by: ANESTHESIOLOGY

## 2023-04-03 PROCEDURE — 45384 PR COLONOSCOPY,REMV LESN,FORCEP/CAUTERY: ICD-10-PCS | Mod: PT,59,, | Performed by: SURGERY

## 2023-04-03 RX ORDER — PROPOFOL 10 MG/ML
VIAL (ML) INTRAVENOUS
Status: DISCONTINUED | OUTPATIENT
Start: 2023-04-03 | End: 2023-04-03

## 2023-04-03 RX ORDER — SODIUM CHLORIDE, SODIUM LACTATE, POTASSIUM CHLORIDE, CALCIUM CHLORIDE 600; 310; 30; 20 MG/100ML; MG/100ML; MG/100ML; MG/100ML
INJECTION, SOLUTION INTRAVENOUS CONTINUOUS
Status: DISCONTINUED | OUTPATIENT
Start: 2023-04-03 | End: 2023-04-03 | Stop reason: HOSPADM

## 2023-04-03 RX ORDER — ONDANSETRON 2 MG/ML
4 INJECTION INTRAMUSCULAR; INTRAVENOUS DAILY PRN
Status: DISCONTINUED | OUTPATIENT
Start: 2023-04-03 | End: 2023-04-03 | Stop reason: HOSPADM

## 2023-04-03 RX ORDER — SODIUM CHLORIDE 9 MG/ML
INJECTION, SOLUTION INTRAVENOUS CONTINUOUS
Status: DISCONTINUED | OUTPATIENT
Start: 2023-04-03 | End: 2023-04-03 | Stop reason: HOSPADM

## 2023-04-03 RX ORDER — SODIUM CHLORIDE, SODIUM LACTATE, POTASSIUM CHLORIDE, CALCIUM CHLORIDE 600; 310; 30; 20 MG/100ML; MG/100ML; MG/100ML; MG/100ML
125 INJECTION, SOLUTION INTRAVENOUS CONTINUOUS
Status: DISCONTINUED | OUTPATIENT
Start: 2023-04-03 | End: 2023-04-03 | Stop reason: HOSPADM

## 2023-04-03 RX ORDER — LIDOCAINE HYDROCHLORIDE 10 MG/ML
1 INJECTION, SOLUTION EPIDURAL; INFILTRATION; INTRACAUDAL; PERINEURAL ONCE
Status: DISCONTINUED | OUTPATIENT
Start: 2023-04-03 | End: 2023-04-03 | Stop reason: HOSPADM

## 2023-04-03 RX ORDER — LIDOCAINE HYDROCHLORIDE 20 MG/ML
INJECTION, SOLUTION EPIDURAL; INFILTRATION; INTRACAUDAL; PERINEURAL
Status: DISCONTINUED | OUTPATIENT
Start: 2023-04-03 | End: 2023-04-03

## 2023-04-03 RX ADMIN — PROPOFOL 50 MG: 10 INJECTION, EMULSION INTRAVENOUS at 10:04

## 2023-04-03 RX ADMIN — SODIUM CHLORIDE, SODIUM LACTATE, POTASSIUM CHLORIDE, AND CALCIUM CHLORIDE: .6; .31; .03; .02 INJECTION, SOLUTION INTRAVENOUS at 09:04

## 2023-04-03 RX ADMIN — PROPOFOL 100 MG: 10 INJECTION, EMULSION INTRAVENOUS at 10:04

## 2023-04-03 RX ADMIN — LIDOCAINE HYDROCHLORIDE 100 MG: 20 INJECTION, SOLUTION EPIDURAL; INFILTRATION; INTRACAUDAL; PERINEURAL at 10:04

## 2023-04-03 NOTE — [employer] PATIENT INSTRUCTIONS
Discharge Summary/Instructions after an Endoscopic Procedure  Patient Name: Austen Parish  Patient MRN: [id number]  Patient YOB: 1955  Monday, April 3, 2023  Anselmo Carranza MD  RESTRICTIONS:  During your procedure today, you received medications for sedation.  These   medications may affect your judgment, balance and coordination.  Therefore,   for 24 hours, you have the following restrictions:   - DO NOT drive a car, operate machinery, make legal/financial decisions,   sign important papers or drink alcohol.    ACTIVITY:  Today: no heavy lifting, straining or running due to procedural   sedation/anesthesia.  The following day: return to full activity including work.  DIET:  Eat and drink normally unless instructed otherwise.     TREATMENT FOR COMMON SIDE EFFECTS:  - Mild abdominal pain, nausea, belching, bloating or excessive gas:  rest,   eat lightly and use a heating pad.  - Sore Throat: treat with throat lozenges and/or gargle with warm salt   water.  - Because air was used during the procedure, expelling large amounts of air   from your rectum or belching is normal.  - If a bowel prep was taken, you may not have a bowel movement for 1-3 days.    This is normal.  SYMPTOMS TO WATCH FOR AND REPORT TO YOUR PHYSICIAN:  1. Abdominal pain or bloating, other than gas cramps.  2. Chest pain.  3. Back pain.  4. Signs of infection such as: chills or fever occurring within 24 hours   after the procedure.  5. Rectal bleeding, which would show as bright red, maroon, or black stools.   (A tablespoon of blood from the rectum is not serious, especially if   hemorrhoids are present.)  6. Vomiting.  7. Weakness or dizziness.  GO DIRECTLY TO THE NEAREST EMERGENCY ROOM IF YOU HAVE ANY OF THE FOLLOWING:      Difficulty breathing              Chills and/or fever over 101 F   Persistent vomiting and/or vomiting blood   Severe abdominal pain   Severe chest pain   Black, tarry stools   Bleeding- more than one  tablespoon   Any other symptom or condition that you feel may need urgent attention  Your doctor recommends these additional instructions:  If any biopsies were taken, your doctors clinic will contact you in 1 to 2   weeks with any results.  - Discharge patient to home (ambulatory).   - Patient has a contact number available for emergencies.  The signs and   symptoms of potential delayed complications were discussed with the   patient.  Return to normal activities tomorrow.  Written discharge   instructions were provided to the patient.   - Resume previous diet.   - Continue present medications.   - Return to primary care physician as previously scheduled.   - Repeat colonoscopy in 3 years for surveillance.  For questions, problems or results please call your physician - Anselmo Carranza MD at Work:  [phone].  [facility] EMERGENCY ROOM PHONE NUMBER: [phone]  IF A COMPLICATION OR EMERGENCY SITUATION ARISES AND YOU ARE UNABLE TO REACH   YOUR PHYSICIAN - GO DIRECTLY TO THE EMERGENCY ROOM.  MD Anselmo Ely MD  4/3/2023 10:52:38 AM  This report has been verified and signed electronically.  Dear patient,  As a result of recent federal legislation (The Federal Cures Act), you may   receive lab or pathology results from your procedure in your [facility]   account before your physician is able to contact you. Your physician or   their representative will relay the results to you with their   recommendations at their soonest availability.  Thank you,  [employer]

## 2023-04-03 NOTE — PLAN OF CARE
Has met unit/department guidelines for discharge from each phase of the post procedure continuum. Leaving floor per w/c with RN. SLADE x3. Resp even and unlabored room air. No distress noted. Encouraging PO fluids. Denies c/o nausea, vomiting or pain. All personal belongings returned to pt.

## 2023-04-03 NOTE — H&P
[facility] H&P Note    Subjective:       Patient ID: Austen Parish is a 67 y.o. male.    Chief Complaint:  Doc I need a colonoscopy.  HPI:  Austen Parish is a 67 y.o. male history of multiple past medical troubles presents today as a new patient referral from Dr. Woods for evaluation of colonoscopy.  Patient's last colonoscopy 2015.  Due for repeat 5 years after that shreyas.  Patient now passed due.  Personal history of colon polyps.  Diverticulosis identified previously.  Patient has had no blood in the stool.  No unexplained weight loss, no bowel habit changes.  No known family history of colon or rectal cancers.    Past Medical History:   Diagnosis Date    Carotid bruit     Lumbago     Obstructive sleep apnea (adult) (pediatric)     Other and unspecified hyperlipidemia     Personal history of colonic polyps 03/26/2015    Darryn Laird MD    Unspecified essential hypertension      Past Surgical History:   Procedure Laterality Date    COLONOSCOPY      Dr. Laird    COLONOSCOPY  03/26/2015    Darryn Laird MD, No polyps, Repeat in 5 years    KNEE ARTHROSCOPY       No family history on file.  Social History     Socioeconomic History    Marital status:    Tobacco Use    Smoking status: Former    Smokeless tobacco: Never   Substance and Sexual Activity    Alcohol use: Yes    Drug use: No    Sexual activity: Not Currently     Partners: Female       No current facility-administered medications for this encounter.     Review of patient's allergies indicates:  No Known Allergies    Review of Systems   Constitutional:  Negative for appetite change, chills and fever.   HENT:  Negative for congestion, dental problem and drooling.    Eyes:  Negative for photophobia, discharge and itching.   Respiratory:  Negative for apnea and chest tightness.    Cardiovascular:  Negative for chest pain, palpitations and leg swelling.   Gastrointestinal:  Negative for abdominal distention and abdominal pain.    Endocrine: Negative for cold intolerance and heat intolerance.   Genitourinary:  Negative for difficulty urinating and dysuria.   Musculoskeletal:  Negative for arthralgias and back pain.   Skin:  Negative for color change and pallor.   Neurological:  Negative for dizziness, facial asymmetry and headaches.   Hematological:  Negative for adenopathy. Does not bruise/bleed easily.   Psychiatric/Behavioral:  Negative for agitation, behavioral problems and confusion.      Objective:      There were no vitals filed for this visit.    Physical Exam  Constitutional:       Appearance: He is well-developed. He is not diaphoretic.   HENT:      Head: Normocephalic and atraumatic.   Eyes:      Pupils: Pupils are equal, round, and reactive to light.   Neck:      Thyroid: No thyromegaly.   Cardiovascular:      Rate and Rhythm: Normal rate and regular rhythm.      Heart sounds: No murmur heard.  Pulmonary:      Effort: Pulmonary effort is normal. No respiratory distress.      Breath sounds: Normal breath sounds.   Abdominal:      General: Bowel sounds are normal. There is no distension.      Palpations: Abdomen is soft.      Tenderness: There is no abdominal tenderness.   Musculoskeletal:         General: Normal range of motion.      Cervical back: Normal range of motion and neck supple.   Skin:     General: Skin is warm.      Capillary Refill: Capillary refill takes less than 2 seconds.      Findings: No erythema or rash.   Neurological:      Mental Status: He is alert and oriented to person, place, and time.      Cranial Nerves: No cranial nerve deficit.        Assessment:       No diagnosis found.        Plan:   There are no diagnoses linked to this encounter.      Medical Decision Making/Counseling:  Personal history of colon polyps due for high risk screening colonoscopy.  Risk benefits have been discussed.  Patient voiced understanding risk benefits wished to proceed near future.      Will obtain preoperative lab test to  include CBC, CMP and EKG for review by the Anesthesiologist the day of the procedure prior to induction of the anesthetic agent of choice.    Risks and benefits of endoscopy were discussed in depth in clinic.  From a procedural standpoint, we discuss the benefits of colonoscopy to be finding colon cancers at early stages, including polyps which can be endoscopically resectable, to finding early stage colon cancers which can be better treated with current medical and surgical therapies in order to give patients a longer survival, if found in these early stages.  From a standpoint of risks, the risk of bleeding and perforation of the colon were discussed.  I personally discussed that if complications of bleeding or perforation were to occur, the patient could need as little as a blood transfusion and as much as possible hospital admission, repeat procedure, or even surgery.  During today's discussion of the procedure of colonoscopy with the patient, I personally jb the patient a picture to assist with counseling.  Total clinic time spent today 30 minutes with greater than half of the time spent in face to face counseling.    Patient instructed that best way to communicate with my office staff is for patient to get on the [facility] patient portal to expedite communication and communication issues that may occur.  Patient was given instructions on how to get on the portal.  I encouraged patient to obtain portal access as well.  Ultimately it is up to the patient to obtain access.  Patient voiced understanding.

## 2023-04-03 NOTE — DISCHARGE INSTRUCTIONS

## 2023-04-03 NOTE — DISCHARGE SUMMARY
Discharge Note        SUMMARY     Admit Date: 4/3/2023    Attending Physician: Anselmo Carranza MD     Discharge Physician: Anselmo Carranza MD    Discharge Date: 4/3/2023 10:50 AM      Hospital Course: Patient tolerated procedure well.     Disposition: Home or Self Care    Patient Instructions:   Current Discharge Medication List        CONTINUE these medications which have NOT CHANGED    Details   amLODIPine (NORVASC) 5 MG tablet TAKE 1 TABLET BY MOUTH EVERY DAY  Qty: 30 tablet, Refills: 11      atorvastatin (LIPITOR) 10 MG tablet Take 1 tablet (10 mg total) by mouth once daily.  Qty: 90 tablet, Refills: 3    Associated Diagnoses: Encounter for long-term (current) use of medications; Obstructive sleep apnea; Chronic low back pain without sciatica, unspecified back pain laterality; Carotid bruit, unspecified laterality; Shortness of breath on exertion; Essential hypertension; Hyperlipidemia, unspecified hyperlipidemia type; Elevated C peptide level; Restless leg      clonazePAM (KLONOPIN) 0.5 MG tablet Take 1 tablet (0.5 mg total) by mouth 2 (two) times daily as needed.  Qty: 20 tablet, Refills: 1    Associated Diagnoses: Restless leg; Encounter for long-term (current) use of medications; Obstructive sleep apnea; Chronic low back pain without sciatica, unspecified back pain laterality; Carotid bruit, unspecified laterality; Shortness of breath on exertion; Essential hypertension; Hyperlipidemia, unspecified hyperlipidemia type; Elevated C peptide level      lisinopriL (PRINIVIL,ZESTRIL) 30 MG tablet TAKE 1 TABLET BY MOUTH EVERY DAY  Qty: 90 tablet, Refills: 3      meloxicam (MOBIC) 15 MG tablet Take 1 tablet (15 mg total) by mouth once daily.  Qty: 30 tablet, Refills: 9    Associated Diagnoses: Essential hypertension; Elevated C peptide level; Encounter for long-term (current) use of medications; Obstructive sleep apnea; Chronic low back pain without sciatica, unspecified back pain laterality; Carotid  bruit, unspecified laterality; Shortness of breath on exertion; Hyperlipidemia, unspecified hyperlipidemia type; Restless leg      aspirin (ECOTRIN) 81 MG EC tablet Take 81 mg by mouth once daily.      methylPREDNISolone (MEDROL DOSEPACK) 4 mg tablet use as directed  Qty: 1 each, Refills: 0    Associated Diagnoses: Upper respiratory tract infection, unspecified type; SOB (shortness of breath); Cough, unspecified type           STOP taking these medications       sod sulf-pot chloride-mag sulf (SUTAB) 1.479-0.188- 0.225 gram tablet Comments:   Reason for Stopping:               Discharge Procedure Orders (must include Diet, Follow-up, Activity):   Discharge Procedure Orders (must include Diet, Follow-up, Activity)   Diet general     Call MD for:  temperature >100.4     Call MD for:  persistent nausea and vomiting     Call MD for:  severe uncontrolled pain     Call MD for:  difficulty breathing, headache or visual disturbances     Call MD for:  redness, tenderness, or signs of infection (pain, swelling, redness, odor or green/yellow discharge around incision site)     Call MD for:  persistent dizziness or light-headedness        Follow Up:  Follow up as scheduled.  Resume routine diet.  Activity as tolerated.    No driving day of procedure.

## 2023-04-03 NOTE — ANESTHESIA PREPROCEDURE EVALUATION
04/03/2023  Austen Parish is a 67 y.o., male.      Pre-op Assessment    I have reviewed the Patient Summary Reports.     I have reviewed the Nursing Notes.    I have reviewed the Medications.     Review of Systems  Anesthesia Hx:  No problems with previous Anesthesia  Neg history of prior surgery. Denies Family Hx of Anesthesia complications.   Denies Personal Hx of Anesthesia complications.   Social:  Former Smoker    Hematology/Oncology:  Hematology Normal   Oncology Normal     EENT/Dental:EENT/Dental Normal   Cardiovascular:   Hypertension    Pulmonary:   Sleep Apnea, CPAP    Education provided regarding risk of obstructive sleep apnea     Renal/:  Renal/ Normal     Hepatic/GI:  Hepatic/GI Normal    Musculoskeletal:   Arthritis     Neurological:  Neurology Normal    Endocrine:  Endocrine Normal    Dermatological:  Skin Normal    Psych:  Psychiatric Normal           Physical Exam  General: Alert    Airway:  Mallampati: II   Mouth Opening: Normal  TM Distance: Normal  Neck ROM: Normal ROM    Dental:  Intact    Chest/Lungs:  Clear to auscultation, Normal Respiratory Rate    Heart:  Rate: Normal    Abdomen:  Normal        Anesthesia Plan  Type of Anesthesia, risks & benefits discussed:    Anesthesia Type: Gen ETT  Post Op Pain Control Plan: multimodal analgesia  Airway Plan: Direct, Post-Induction  Informed Consent: Informed consent signed with the Patient and all parties understand the risks and agree with anesthesia plan.  All questions answered. Patient consented to blood products? No  ASA Score: 3  Day of Surgery Review of History & Physical: H&P Update referred to the surgeon/provider.    Ready For Surgery From Anesthesia Perspective.     .

## 2023-04-03 NOTE — ANESTHESIA POSTPROCEDURE EVALUATION
Anesthesia Post Evaluation    Patient: Austen Parish    Procedure(s) Performed: Procedure(s) (LRB):  COLONOSCOPY (N/A)    Final Anesthesia Type: general      Patient location during evaluation: PACU  Patient participation: Yes- Able to Participate  Level of consciousness: awake and awake and alert  Post-procedure vital signs: reviewed and stable  Pain management: adequate  Airway patency: patent    PONV status at discharge: No PONV  Anesthetic complications: no      Cardiovascular status: blood pressure returned to baseline  Respiratory status: unassisted and spontaneous ventilation  Hydration status: euvolemic  Follow-up not needed.          Vitals Value Taken Time   /82 04/03/23 1140   Temp 36.7 °C (98 °F) 04/03/23 1055   Pulse 67 04/03/23 1140   Resp 18 04/03/23 1140   SpO2 97 % 04/03/23 1140         Event Time   Out of Recovery 11:25:00         Pain/Cleo Score: Cleo Score: 9 (4/3/2023 11:25 AM)  Modified Cleo Score: 19 (4/3/2023 11:46 AM)

## 2023-04-03 NOTE — TRANSFER OF CARE
"Anesthesia Transfer of Care Note    Patient: Austen Parish    Procedure(s) Performed: Procedure(s) (LRB):  COLONOSCOPY (N/A)    Patient location: PACU    Anesthesia Type: general    Transport from OR: Transported from OR on room air with adequate spontaneous ventilation    Post pain: adequate analgesia    Post assessment: no apparent anesthetic complications and tolerated procedure well    Post vital signs: stable    Level of consciousness: awake, alert and oriented    Nausea/Vomiting: no nausea/vomiting    Complications: none    Transfer of care protocol was followed      Last vitals:   Visit Vitals  BP (!) 167/94   Pulse 65   Temp 36.7 °C (98.1 °F)   Resp 16   Ht 5' 10" (1.778 m)   Wt 130.2 kg (287 lb)   SpO2 97%   BMI 41.18 kg/m²     "

## 2023-04-10 LAB
FINAL PATHOLOGIC DIAGNOSIS: NORMAL
GROSS: NORMAL
Lab: NORMAL

## (undated) DEVICE — GLOVE SURG ULTRA TOUCH 7.5

## (undated) DEVICE — KIT DEFENDO VLV  AIR WATER SUC

## (undated) DEVICE — KIT CANIST SUCTION 1200CC

## (undated) DEVICE — TRAP MUCUS SPECIMEN 40CC

## (undated) DEVICE — SYR SLIP TIP 5CC